# Patient Record
Sex: FEMALE | Race: WHITE | Employment: OTHER | URBAN - METROPOLITAN AREA
[De-identification: names, ages, dates, MRNs, and addresses within clinical notes are randomized per-mention and may not be internally consistent; named-entity substitution may affect disease eponyms.]

---

## 2017-01-11 ENCOUNTER — TELEPHONE (OUTPATIENT)
Dept: FAMILY MEDICINE CLINIC | Facility: CLINIC | Age: 67
End: 2017-01-11

## 2017-01-12 ENCOUNTER — TELEPHONE (OUTPATIENT)
Dept: FAMILY MEDICINE CLINIC | Facility: CLINIC | Age: 67
End: 2017-01-12

## 2017-01-13 ENCOUNTER — TELEPHONE (OUTPATIENT)
Dept: FAMILY MEDICINE CLINIC | Facility: CLINIC | Age: 67
End: 2017-01-13

## 2017-01-13 NOTE — TELEPHONE ENCOUNTER
Reason:  To address the following health maintenance concerns.      Adult Pneumonia Vaccine          Comments:     I had my prevnar pneumonia vaccine at [place] [address] in August, 2016.  This information was reported to Dr. Claudia Murray back in August. Also, I have inform

## 2017-01-25 ENCOUNTER — TELEPHONE (OUTPATIENT)
Dept: FAMILY MEDICINE CLINIC | Facility: CLINIC | Age: 67
End: 2017-01-25

## 2017-02-09 ENCOUNTER — TELEPHONE (OUTPATIENT)
Dept: FAMILY MEDICINE CLINIC | Facility: CLINIC | Age: 67
End: 2017-02-09

## 2017-04-03 ENCOUNTER — TELEPHONE (OUTPATIENT)
Dept: FAMILY MEDICINE CLINIC | Facility: CLINIC | Age: 67
End: 2017-04-03

## 2017-04-21 ENCOUNTER — TELEPHONE (OUTPATIENT)
Dept: FAMILY MEDICINE CLINIC | Facility: CLINIC | Age: 67
End: 2017-04-21

## 2017-05-25 ENCOUNTER — TELEPHONE (OUTPATIENT)
Dept: FAMILY MEDICINE CLINIC | Facility: CLINIC | Age: 67
End: 2017-05-25

## 2017-06-08 ENCOUNTER — PRIOR ORIGINAL RECORDS (OUTPATIENT)
Dept: OTHER | Age: 67
End: 2017-06-08

## 2017-07-05 ENCOUNTER — OFFICE VISIT (OUTPATIENT)
Dept: FAMILY MEDICINE CLINIC | Facility: CLINIC | Age: 67
End: 2017-07-05

## 2017-07-05 VITALS
TEMPERATURE: 99 F | DIASTOLIC BLOOD PRESSURE: 70 MMHG | BODY MASS INDEX: 23.68 KG/M2 | WEIGHT: 132 LBS | SYSTOLIC BLOOD PRESSURE: 118 MMHG | HEART RATE: 74 BPM | RESPIRATION RATE: 16 BRPM | HEIGHT: 62.5 IN

## 2017-07-05 DIAGNOSIS — R42 DIZZINESS: ICD-10-CM

## 2017-07-05 DIAGNOSIS — H93.8X2 EAR FULLNESS, LEFT: Primary | ICD-10-CM

## 2017-07-05 PROCEDURE — 99213 OFFICE O/P EST LOW 20 MIN: CPT | Performed by: NURSE PRACTITIONER

## 2017-07-05 RX ORDER — FEXOFENADINE HCL 180 MG/1
180 TABLET ORAL DAILY
Qty: 30 TABLET | Refills: 4 | Status: SHIPPED | OUTPATIENT
Start: 2017-07-05 | End: 2018-06-18 | Stop reason: ALTCHOICE

## 2017-07-05 RX ORDER — DILTIAZEM HYDROCHLORIDE 60 MG/1
60 TABLET, FILM COATED ORAL AS NEEDED
COMMUNITY

## 2017-07-05 RX ORDER — METHYLPREDNISOLONE 4 MG/1
TABLET ORAL
Qty: 1 KIT | Refills: 0 | Status: SHIPPED | OUTPATIENT
Start: 2017-07-05 | End: 2017-07-27 | Stop reason: ALTCHOICE

## 2017-07-05 NOTE — PATIENT INSTRUCTIONS
Thank you for choosing [facility] at [place] [address]  To Do: Jefferson Lo  1. Start taking medrol dose pack (as directed)  2. Start taking allegra (daily in the AM) and continue w/ flonase (nasal spray)  3.  If no improvement f/u the c risks of treatment even beyond those discussed today.  All therapies have potential risk of harm or side effects or medication interactions.  It is your duty and for your safety to discuss with the pharmacist and our office with questions, and to notify us

## 2017-07-05 NOTE — PROGRESS NOTES
[facility] Internal Medicine Office Note  Chief Complaint:   Patient presents with:  Ear Pain: L ear \"feels full\" x 2 days  Dizziness: x 2 days    HPI:   This is a 78year old female coming in for  HPI  C/o left ear fullness, decrease hearing, mouth. Disp:  Rfl:          REVIEW OF SYSTEMS:   Review of Systems   Constitutional: Negative for chills, fatigue and fever. HENT: Negative for congestion, ear pain, postnasal drip, sinus pressure, sore throat and trouble swallowing.          Left ear ful methylPREDNISolone (MEDROL) 4 MG Oral Tablet Therapy Pack; As directed. -     Fexofenadine HCl (ALLEGRA ALLERGY) 180 MG Oral Tab; Take 1 tablet (180 mg total) by mouth daily.     1. Medrol dose pack, allegra and f/u in the clinic if no improvement    No or

## 2017-07-07 ENCOUNTER — TELEPHONE (OUTPATIENT)
Dept: FAMILY MEDICINE CLINIC | Facility: CLINIC | Age: 67
End: 2017-07-07

## 2017-07-07 NOTE — TELEPHONE ENCOUNTER
Time started: 1266    Time ended: 0948    Total time spent on chart: 4 min. Called patient to discuss further. Patient state has been taking the medrol dose pack for 2 days and has been experiencing heart palpitations since.  Patient has not taken any t

## 2017-07-26 ENCOUNTER — TELEPHONE (OUTPATIENT)
Dept: FAMILY MEDICINE CLINIC | Facility: CLINIC | Age: 67
End: 2017-07-26

## 2017-07-26 NOTE — TELEPHONE ENCOUNTER
Pt called booked an appt for tmrw 7/27 states she broke out in rash from the back of neck to her shoulders pt would like to know what she should do from now until her appt as she is having chills now

## 2017-07-26 NOTE — TELEPHONE ENCOUNTER
Time started: 1:35pm    Time ended: 1:38pm    Total time spent on chart: 3 min    Spoke with patient who states that yesterday the back of her neck was very sore and tingly so she put some biofreeze cream on it.  Today there is a rash from the scalp down th

## 2017-07-27 ENCOUNTER — OFFICE VISIT (OUTPATIENT)
Dept: FAMILY MEDICINE CLINIC | Facility: CLINIC | Age: 67
End: 2017-07-27

## 2017-07-27 ENCOUNTER — TELEPHONE (OUTPATIENT)
Dept: FAMILY MEDICINE CLINIC | Facility: CLINIC | Age: 67
End: 2017-07-27

## 2017-07-27 VITALS
BODY MASS INDEX: 24.29 KG/M2 | HEART RATE: 72 BPM | TEMPERATURE: 99 F | RESPIRATION RATE: 18 BRPM | SYSTOLIC BLOOD PRESSURE: 108 MMHG | HEIGHT: 62.5 IN | WEIGHT: 135.38 LBS | DIASTOLIC BLOOD PRESSURE: 64 MMHG

## 2017-07-27 DIAGNOSIS — B02.9 HERPES ZOSTER WITHOUT COMPLICATION: Primary | ICD-10-CM

## 2017-07-27 PROCEDURE — 99213 OFFICE O/P EST LOW 20 MIN: CPT | Performed by: NURSE PRACTITIONER

## 2017-07-27 RX ORDER — VALACYCLOVIR HYDROCHLORIDE 1 G/1
1 TABLET, FILM COATED ORAL 3 TIMES DAILY
Qty: 21 TABLET | Refills: 0 | Status: SHIPPED | OUTPATIENT
Start: 2017-07-27 | End: 2017-08-03

## 2017-07-27 RX ORDER — GABAPENTIN 100 MG/1
100 CAPSULE ORAL 2 TIMES DAILY
Qty: 30 CAPSULE | Refills: 0 | Status: SHIPPED | OUTPATIENT
Start: 2017-07-27 | End: 2017-07-31

## 2017-07-27 NOTE — TELEPHONE ENCOUNTER
Time started: 8:54am     Time ended: 8:55am    Total time spent on chart: 1min    Patient aware that as long as the area is covered and she is not scratching at the area it is ok for her to attend the party. Also as long as she is afebrile.  Patient voiced

## 2017-07-27 NOTE — PATIENT INSTRUCTIONS
Thank you for choosing [facility] at [place] [address]  To Do: Anayeli Narayan  1.  Start taking anti-viral (valtrex- 1 tablet 3 times per day) X 7 days  - can take gabapentin (as needed for nerve pain prn)  - if no improvement contact the o • Please call our office about any questions regarding your treatment/medicines/tests as a result of today's visit.  For your safety, read the entire package insert of all medicines prescribed to you and be aware of all of the risks of treatment even beyon Shingles is a viral infection caused by the same virus as chicken pox. Anyone who has had chicken pox may get shingles later in life. The virus stays in the body, but remains dormant (asleep).  Shingles often occurs in older persons or persons with lowered · Trim fingernails and try not to scratch. Scratching the sores may leave scars. · Stay home from work or school until all blisters have formed a crust and you are no longer contagious.   Follow-up care  Follow up with your healthcare provider or as direct

## 2017-07-27 NOTE — PROGRESS NOTES
[facility] Internal Medicine Office Note  Chief Complaint:   Patient presents with:  Rash: Started 7/25/17 to right shoulder. Red, raised, fluid filled, painful and tingling.  Has since spread to right chest. Patient had chills on 7/26/17, none cu Fluticasone Propionate (FLONASE) 50 MCG/ACT Nasal Suspension 3 sprays by Nasal route daily. Disp: 3 Bottle Rfl: 3   Ca & Phos-Vit D-Mag (CALCIUM) 284--227 Oral Tab Take  by mouth 3 (three) times daily.  Disp:  Rfl:    Ranitidine HCl (ZANTAC OR) Take 1 Pulmonary/Chest: Effort normal and breath sounds normal.   Neurological: She is alert and oriented to person, place, and time. Skin: Skin is warm, dry and intact. Rash noted. Rash is papular and vesicular.         R neck/shoulder/chest w/ vasicular and pa History of dizziness     Head trauma     MDS (myelodysplastic syndrome) (HCC)     MVP (mitral valve prolapse)     PVC (premature ventricular contraction)     PAC (premature atrial contraction)     Pain of right clavicle     Rotator cuff disorder     Pal

## 2017-07-27 NOTE — TELEPHONE ENCOUNTER
Pt sts she was seen today by Hood Chavez and was diag with Shingles. Pt sts she is going to a Party on 7/30 and there are going to be young children 3 and under. Pt wants to know if she is contagious?   Or if the children have not been vaccinated for ckn pox, s

## 2017-07-31 ENCOUNTER — OFFICE VISIT (OUTPATIENT)
Dept: FAMILY MEDICINE CLINIC | Facility: CLINIC | Age: 67
End: 2017-07-31

## 2017-07-31 ENCOUNTER — TELEPHONE (OUTPATIENT)
Dept: FAMILY MEDICINE CLINIC | Facility: CLINIC | Age: 67
End: 2017-07-31

## 2017-07-31 VITALS
WEIGHT: 132.38 LBS | TEMPERATURE: 98 F | BODY MASS INDEX: 23.75 KG/M2 | HEIGHT: 62.5 IN | SYSTOLIC BLOOD PRESSURE: 138 MMHG | RESPIRATION RATE: 16 BRPM | HEART RATE: 80 BPM | DIASTOLIC BLOOD PRESSURE: 64 MMHG

## 2017-07-31 DIAGNOSIS — B02.9 HERPES ZOSTER WITHOUT COMPLICATION: Primary | ICD-10-CM

## 2017-07-31 PROCEDURE — 99213 OFFICE O/P EST LOW 20 MIN: CPT | Performed by: NURSE PRACTITIONER

## 2017-07-31 RX ORDER — GABAPENTIN 100 MG/1
300 CAPSULE ORAL 3 TIMES DAILY
Qty: 30 CAPSULE | Refills: 0 | Status: SHIPPED | OUTPATIENT
Start: 2017-07-31 | End: 2017-08-10

## 2017-07-31 RX ORDER — HYDROCODONE BITARTRATE AND ACETAMINOPHEN 5; 325 MG/1; MG/1
1 TABLET ORAL EVERY 8 HOURS PRN
Qty: 30 TABLET | Refills: 0 | Status: SHIPPED | OUTPATIENT
Start: 2017-07-31 | End: 2017-08-28

## 2017-07-31 RX ORDER — VALACYCLOVIR HYDROCHLORIDE 1 G/1
1 TABLET, FILM COATED ORAL 3 TIMES DAILY
Qty: 21 TABLET | Refills: 0 | Status: SHIPPED | OUTPATIENT
Start: 2017-07-31 | End: 2017-08-07

## 2017-07-31 NOTE — TELEPHONE ENCOUNTER
Dr. Reyes Dire from [facility] is calling to get clarification in the prescription for gabapentin 100 MG Oral Cap. Please advise.

## 2017-07-31 NOTE — PATIENT INSTRUCTIONS
Thank you for choosing [facility] at [place] [address]  To Do: Vilma Lee  1. Going to extend Valtrex (antibiotic for 7 more days)--> total of 14 days  2. Going to increase gabapentin (nerve pain) to 300mg   3.  Going to add norco (for all of the risks of treatment even beyond those discussed today.  All therapies have potential risk of harm or side effects or medication interactions.  It is your duty and for your safety to discuss with the pharmacist and our office with questions, and t

## 2017-07-31 NOTE — TELEPHONE ENCOUNTER
PER OV notes:  increase gabapentin to 300mg TID. Pharmacy is requesting clarification as Pt states she is suppose to take 300mg TID but for 7 days only, which she would only need 21 tabs.  Please advise      Medication Quantity Refills Start End   gabape

## 2017-08-10 ENCOUNTER — TELEPHONE (OUTPATIENT)
Dept: FAMILY MEDICINE CLINIC | Facility: CLINIC | Age: 67
End: 2017-08-10

## 2017-08-10 DIAGNOSIS — B02.9 HERPES ZOSTER WITHOUT COMPLICATION: ICD-10-CM

## 2017-08-10 RX ORDER — GABAPENTIN 100 MG/1
300 CAPSULE ORAL 3 TIMES DAILY
Qty: 30 CAPSULE | Refills: 0 | Status: CANCELLED | OUTPATIENT
Start: 2017-08-10

## 2017-08-10 RX ORDER — GABAPENTIN 100 MG/1
400 CAPSULE ORAL 3 TIMES DAILY
Qty: 90 CAPSULE | Refills: 0 | Status: SHIPPED | OUTPATIENT
Start: 2017-08-10 | End: 2017-08-14

## 2017-08-10 NOTE — TELEPHONE ENCOUNTER
Patient informed of Joseph Drake recommendations to increase gabapentin and see Dr. Joey Tripp, neurology. She does have norco left and takes only half a tab prn - she has plenty left and will use prn 1/2 tab. Patient will call Dr. Joey Tripp now.   Referral

## 2017-08-10 NOTE — TELEPHONE ENCOUNTER
Pt called states she has shingles, would like to know if she should still be taking rx gabapentin 100 MG Oral Cap

## 2017-08-10 NOTE — TELEPHONE ENCOUNTER
Time started: 0916  Time ended: 4055  Total time spent on chart: 7 min    Patient has shingles treated 7/27 and 7/31 by you  Pain has subsided in other areas, but still having neck and head pain - excruciating hard to sleep.   She is now out of gabapentin a

## 2017-08-14 ENCOUNTER — OFFICE VISIT (OUTPATIENT)
Dept: NEUROLOGY | Facility: CLINIC | Age: 67
End: 2017-08-14

## 2017-08-14 VITALS
HEART RATE: 78 BPM | HEIGHT: 62.5 IN | BODY MASS INDEX: 24.4 KG/M2 | RESPIRATION RATE: 16 BRPM | DIASTOLIC BLOOD PRESSURE: 70 MMHG | SYSTOLIC BLOOD PRESSURE: 102 MMHG | WEIGHT: 136 LBS

## 2017-08-14 DIAGNOSIS — G47.00 INSOMNIA, UNSPECIFIED: ICD-10-CM

## 2017-08-14 DIAGNOSIS — B02.23 POST-HERPETIC POLYNEUROPATHY: ICD-10-CM

## 2017-08-14 DIAGNOSIS — R21 SKIN RASH: ICD-10-CM

## 2017-08-14 DIAGNOSIS — B02.29 POST HERPETIC NEURALGIA: Primary | ICD-10-CM

## 2017-08-14 PROBLEM — B02.9 SHINGLES OUTBREAK: Status: ACTIVE | Noted: 2017-08-14

## 2017-08-14 PROCEDURE — 99204 OFFICE O/P NEW MOD 45 MIN: CPT | Performed by: OTHER

## 2017-08-14 RX ORDER — AMITRIPTYLINE HYDROCHLORIDE 10 MG/1
TABLET, FILM COATED ORAL
Qty: 60 TABLET | Refills: 11 | Status: SHIPPED | OUTPATIENT
Start: 2017-08-14 | End: 2017-08-28

## 2017-08-14 RX ORDER — DOXEPIN HYDROCHLORIDE 50 MG/1
1 CAPSULE ORAL DAILY
COMMUNITY

## 2017-08-14 RX ORDER — GABAPENTIN 300 MG/1
CAPSULE ORAL
Refills: 0 | COMMUNITY
Start: 2017-07-31 | End: 2017-08-14

## 2017-08-14 RX ORDER — GABAPENTIN 300 MG/1
CAPSULE ORAL
Qty: 180 CAPSULE | Refills: 5 | Status: SHIPPED | OUTPATIENT
Start: 2017-08-14 | End: 2017-08-28

## 2017-08-14 NOTE — PROGRESS NOTES
[facility]   Neurology; INITIAL CLINIC VISIT  2017, 10:53 AM     Alondra Garcia Patient Status:  No patient class for patient encounter    1950 MRN [id number]   Location [address] [place] [address] BRAVO ESOPHAGOGASTRODUODENOSCOPY;                 Surgeon: Luann Waterman MD;  Location: [address] [place] [address]                ENDOSCOPY  No date: HYSTERECTOMY  No date: SINUS SURGERY        Comment: x2  No date: TONSILLECTOMY    FAMILY HISTORY:  family history includes Art HCl (ZANTAC OR) Take 1 tablet by mouth daily. Disp:  Rfl:    Cholecalciferol (VITAMIN D3) 5000 UNIT/ML Oral Liquid Take  by mouth. Disp:  Rfl:    B Complex-C (SUPER B COMPLEX OR) Take  by mouth.  Disp:  Rfl:          REVIEW OF SYSTEMS:    GENERAL HEALTH:  f expression and comprehension, no dysarthria, voice is normal in volume, follow commends well;  Memory and comprehension:  MMSE is normal,   Cranial Nerves       CN II:  Visual fields full, Pupils equal and reactive, Fundi normal       CN III, IV, VI:  Horr and rest next few weeks. Side effect was discussed. Return in about 4 weeks (around 9/11/2017). Adjust medication dose    We discussed in depth regarding diagnosis, prognosis, treatment.  The patient and partener  were given ample opportunity to as

## 2017-08-14 NOTE — PATIENT INSTRUCTIONS
Refill policies:    • Allow 2-3 business days for refills; controlled substances may take longer.   • Contact your pharmacy at least 5 days prior to running out of medication and have them send an electronic request or submit request through the [facility] have a procedure or additional testing performed. [facility]) will contact your insurance carrier to obtain pre-certification or prior authorization.     Unfortunately, JOSIE has seen an increase in denial of payment even though the p

## 2017-08-15 ENCOUNTER — TELEPHONE (OUTPATIENT)
Dept: NEUROLOGY | Facility: CLINIC | Age: 67
End: 2017-08-15

## 2017-08-15 DIAGNOSIS — B02.29 POST HERPETIC NEURALGIA: Primary | ICD-10-CM

## 2017-08-15 NOTE — TELEPHONE ENCOUNTER
Giselle Orta from [employer] called for additional diagnosis for TSH lab. Provided diagnosis of insomnia with code. No further action required.

## 2017-08-15 NOTE — TELEPHONE ENCOUNTER
Rec'd incoming fax from [facility] with request for PA for Amitriptyline. Pharmacy initiated PA in CoverMyMeds. Key: [id number]. Referral Initiated.

## 2017-08-16 LAB
ANA SCREEN, IFA: NEGATIVE
C-REACTIVE PROTEIN: 0.12 MG/DL
FOLATE, SERUM: >24 NG/ML
SED RATE BY MODIFIED$WESTERGREN: 17 MM/H
TSH: 0.92 MIU/L (ref 0.4–4.5)
VITAMIN B12: 1309 PG/ML (ref 200–1100)

## 2017-08-16 RX ORDER — PREGABALIN 50 MG/1
50 CAPSULE ORAL 2 TIMES DAILY
Qty: 60 CAPSULE | Refills: 11 | OUTPATIENT
Start: 2017-08-16 | End: 2017-08-28

## 2017-08-16 NOTE — TELEPHONE ENCOUNTER
Informed patient of doctors recommendations. Verbalized understanding with no further questions or concerns at this time.

## 2017-08-16 NOTE — TELEPHONE ENCOUNTER
Rec'd incoming faxed letter dated 8/16/17 from [facility] with DENIAL of PA for amitriptyline. The denial is based on the fact that the patient must have a history, contraindication, or intolerance to [facility].   Additionally, the letter states that Amitriptyli

## 2017-08-16 NOTE — TELEPHONE ENCOUNTER
Rec'd incoming fax from [facility] indicating that additional information is needed to complete PA for Amitriptyline. Form completed and faxed back to [facility] with fax confirmation rec'd.

## 2017-08-16 NOTE — TELEPHONE ENCOUNTER
Patient informed that  Medication was denied coverage for amitriptyline. Patient paid for it out of pocket asked if an appeal could be done. Patient said she took medication last night and it is the first time she has slept comfortable in 2 weeks.

## 2017-08-17 ENCOUNTER — TELEPHONE (OUTPATIENT)
Dept: NEUROLOGY | Facility: CLINIC | Age: 67
End: 2017-08-17

## 2017-08-17 NOTE — TELEPHONE ENCOUNTER
----- Message from Regino Garcia MD sent at 8/16/2017  3:20 PM CDT -----  Lab normal    Relayed no concerning findings on blood work on confidential voicemail (ok per HIPAA). Encouraged call back with any questions/concerns.

## 2017-08-17 NOTE — TELEPHONE ENCOUNTER
Pt calling to report that yesterday she discovered three small purple spots on anterior side of right forearm. They are shaped in a triangle, and about half the size of a pea. The are not raised, not painful, not itching.   They are not bothering her in a

## 2017-08-18 NOTE — TELEPHONE ENCOUNTER
Spoke with patient and relayed message from Dr. Radha Escobar. Pt verbalized understanding, agrees to plan, and expresses intent to comply with recommendations given.

## 2017-08-28 ENCOUNTER — OFFICE VISIT (OUTPATIENT)
Dept: NEUROLOGY | Facility: CLINIC | Age: 67
End: 2017-08-28

## 2017-08-28 VITALS
HEART RATE: 78 BPM | DIASTOLIC BLOOD PRESSURE: 64 MMHG | SYSTOLIC BLOOD PRESSURE: 102 MMHG | HEIGHT: 62.5 IN | BODY MASS INDEX: 24.4 KG/M2 | WEIGHT: 136 LBS | RESPIRATION RATE: 16 BRPM

## 2017-08-28 DIAGNOSIS — B02.29 POST HERPETIC NEURALGIA: Primary | ICD-10-CM

## 2017-08-28 DIAGNOSIS — B02.23 POST-HERPETIC POLYNEUROPATHY: ICD-10-CM

## 2017-08-28 DIAGNOSIS — G93.3 POSTVIRAL FATIGUE SYNDROME: ICD-10-CM

## 2017-08-28 PROCEDURE — 99214 OFFICE O/P EST MOD 30 MIN: CPT | Performed by: OTHER

## 2017-08-28 RX ORDER — AMITRIPTYLINE HYDROCHLORIDE 10 MG/1
10 TABLET, FILM COATED ORAL NIGHTLY
Qty: 90 TABLET | Refills: 3 | Status: SHIPPED | OUTPATIENT
Start: 2017-08-28 | End: 2017-09-27

## 2017-08-28 RX ORDER — AMITRIPTYLINE HYDROCHLORIDE 10 MG/1
10 TABLET, FILM COATED ORAL NIGHTLY
Qty: 30 TABLET | Refills: 11 | Status: SHIPPED | OUTPATIENT
Start: 2017-08-28 | End: 2017-08-28

## 2017-08-28 RX ORDER — GABAPENTIN 300 MG/1
600 CAPSULE ORAL 3 TIMES DAILY
Qty: 270 CAPSULE | Refills: 3 | Status: SHIPPED | OUTPATIENT
Start: 2017-08-28 | End: 2018-06-18 | Stop reason: ALTCHOICE

## 2017-08-28 NOTE — PATIENT INSTRUCTIONS
Refill policies:    • Allow 2-3 business days for refills; controlled substances may take longer.   • Contact your pharmacy at least 5 days prior to running out of medication and have them send an electronic request or submit request through the [facility] have a procedure or additional testing performed. [facility]) will contact your insurance carrier to obtain pre-certification or prior authorization.     Unfortunately, JOSIE has seen an increase in denial of payment even though the p

## 2017-08-28 NOTE — PROGRESS NOTES
[facility]   Neurology; follow  CLINIC VISIT  2017    Josué Sebastian Patient Status:  No patient class for patient encounter    1950 MRN [id number]   Location ED [facility], [address], [address] [facility] PCP Alejandra Solares (chronic) 6/29/2012       PAST SURGICAL HISTORY:  Past Surgical History:  2008: COLONOSCOPY  5/2014: COLONOSCOPY  10/11/2016: EGD N/A      Comment: Procedure: BRAVO ESOPHAGOGASTRODUODENOSCOPY;                 Surgeon: Louisa Terrell MD;  Location: [facility] daily. Disp:  Rfl:    Cholecalciferol (VITAMIN D3) 5000 UNIT/ML Oral Liquid Take  by mouth. Disp:  Rfl:    B Complex-C (SUPER B COMPLEX OR) Take  by mouth.  Disp:  Rfl:          REVIEW OF SYSTEMS:    GENERAL HEALTH:  feels well, calm, normal appetite,   EYE voice is normal in volume, follow commends well;  Memory and comprehension:  MMSE is normal,   Cranial Nerves       CN II:  Visual fields full, Pupils equal and reactive, Fundi normal       CN III, IV, VI:  Horrizontal and vertical movements normal.  [facility]   Neuromuscular/ Electrodiagnostic Specialist  [facility]  8/28/2017

## 2017-09-05 ENCOUNTER — TELEPHONE (OUTPATIENT)
Dept: NEUROLOGY | Facility: CLINIC | Age: 67
End: 2017-09-05

## 2017-09-05 NOTE — TELEPHONE ENCOUNTER
RX for Gabapentin was printed at 8/28/17 OV. Per patient she mailed RX to [facility]. Patient asking for clarification of Elavil dose. States first RX (8/14/17) she received stated to take one 10 mg Amitriptyline nightly x 1 week then increase to 20 mg nightly.

## 2017-09-08 ENCOUNTER — TELEPHONE (OUTPATIENT)
Dept: NEUROLOGY | Facility: CLINIC | Age: 67
End: 2017-09-08

## 2017-09-08 DIAGNOSIS — B02.29 POST HERPETIC NEURALGIA: Primary | ICD-10-CM

## 2017-09-08 NOTE — TELEPHONE ENCOUNTER
Patient states she has been paying out of pocket for her Amitriptyline but now with 90 day supply ordered it is too expensive. Was told by Jami Preston that a PA needs to be done. PA was denied previously because patient had not tried Lyrica.  Patient did try L

## 2017-09-11 NOTE — TELEPHONE ENCOUNTER
Started PA via cover my meds with [facility]    Pending carroll [facility] [place]    Pending case ER#[id number]

## 2017-09-20 NOTE — TELEPHONE ENCOUNTER
Patient called back. She stated she called [facility] and they have the script for Amitriptyline which will be mailed to her. Patient stated she called [facility] and cancelled her script for Amitriptyline.

## 2017-09-20 NOTE — TELEPHONE ENCOUNTER
Received a letter from [facility] they approved Amitriptyline HCL    Auth#[id number]

## 2017-09-20 NOTE — TELEPHONE ENCOUNTER
Spoke with pharmacy and informed them of approval of Amitriptyline. They were able to run the Rx. Contacted patient and informed her Rx was being processed at [facility].   She states she usually gets her long term Rxs from [facility], but will call W

## 2017-09-27 RX ORDER — AMITRIPTYLINE HYDROCHLORIDE 10 MG/1
10 TABLET, FILM COATED ORAL NIGHTLY
Qty: 90 TABLET | Refills: 3 | Status: SHIPPED | OUTPATIENT
Start: 2017-09-27 | End: 2018-06-18 | Stop reason: ALTCHOICE

## 2017-09-27 NOTE — TELEPHONE ENCOUNTER
Spoke with Merly Rockwell. According to [facility] records, on 09/20/17 the patient wanted to send a mobile picture of the prescription and they informed her she needed the doctors office to send the prescription. See TE from 08/15/17.  Informed Ariadna mayorga

## 2017-09-27 NOTE — TELEPHONE ENCOUNTER
Pt would like to know if her prescription for Amitriptyline can be sent to her pharmacy [facility] again because one person stated they received the approval and another person said they did not receive a script.  If there is confusion the patient states her

## 2017-09-29 ENCOUNTER — TELEPHONE (OUTPATIENT)
Dept: NEUROLOGY | Facility: CLINIC | Age: 67
End: 2017-09-29

## 2017-09-29 NOTE — TELEPHONE ENCOUNTER
Patient [place] going out [place] the country on [place] at 12[place].    Is concerned about weight gain on Amitriptyline and Gabapentin. Has gained 6 lb in the last month since being on Gabapentin. Is taking 2 capsules on 300mg Gabapentin TID.  Is wanting to take off

## 2017-10-02 NOTE — TELEPHONE ENCOUNTER
Spoke with patient and relayed below message from Dr. Marti Jo. Pt verbalized understanding, agrees to plan, and expresses intent to comply with recommendations given.     Galen Jeans, MD   to [facility] Nurse           9:07 AM   Yes, avoid sunlight with el

## 2017-10-30 ENCOUNTER — OFFICE VISIT (OUTPATIENT)
Dept: NEUROLOGY | Facility: CLINIC | Age: 67
End: 2017-10-30

## 2017-10-30 VITALS
BODY MASS INDEX: 25.48 KG/M2 | HEART RATE: 78 BPM | RESPIRATION RATE: 16 BRPM | DIASTOLIC BLOOD PRESSURE: 80 MMHG | SYSTOLIC BLOOD PRESSURE: 120 MMHG | WEIGHT: 142 LBS | HEIGHT: 62.5 IN

## 2017-10-30 DIAGNOSIS — F51.01 PRIMARY INSOMNIA: ICD-10-CM

## 2017-10-30 DIAGNOSIS — B02.29 POST HERPETIC NEURALGIA: Primary | ICD-10-CM

## 2017-10-30 DIAGNOSIS — R56.9 SEIZURES (HCC): ICD-10-CM

## 2017-10-30 DIAGNOSIS — R63.5 WEIGHT GAIN: ICD-10-CM

## 2017-10-30 PROCEDURE — 99214 OFFICE O/P EST MOD 30 MIN: CPT | Performed by: OTHER

## 2017-10-30 RX ORDER — CARBAMAZEPINE 200 MG/1
200 TABLET ORAL 2 TIMES DAILY
Qty: 60 TABLET | Refills: 11 | Status: SHIPPED | OUTPATIENT
Start: 2017-10-30 | End: 2018-06-18 | Stop reason: ALTCHOICE

## 2017-10-30 RX ORDER — INFLUENZA A VIRUSA/MICHIGAN/45/2015 X-275 (H1N1) ANTIGEN (FORMALDEHYDE INACTIVATED), INFLUENZA A VIRUS A/HONG KONG/4801/2014 X-263B (H3N2) ANTIGEN (FORMALDEHYDE INACTIVATED), AND INFLUENZA B VIRUS B/BRISBANE/60/2008 ANTIGEN (FORMALDEHYDE INACTIVATED) 60; 60; 60 UG/.5ML; UG/.5ML; UG/.5ML
INJECTION, SUSPENSION INTRAMUSCULAR
Refills: 0 | COMMUNITY
Start: 2017-09-07 | End: 2018-06-18 | Stop reason: ALTCHOICE

## 2017-10-30 NOTE — PATIENT INSTRUCTIONS
Refill policies:    • Allow 2-3 business days for refills; controlled substances may take longer.   • Contact your pharmacy at least 5 days prior to running out of medication and have them send an electronic request or submit request through the [facility] have a procedure or additional testing performed. [facility]) will contact your insurance carrier to obtain pre-certification or prior authorization.     Unfortunately, JOSIE has seen an increase in denial of payment even though the p

## 2017-10-30 NOTE — PROGRESS NOTES
[facility]   Neurology; follow  CLINIC VISIT  10/30/2017    Chris Villanueva Patient Status:  No patient class for patient encounter    1950 MRN [id number]   Location [address] [place] [address], [address] [place] [address], [address] [place] [address] PCP Cara Lemons contraction) 6/5/2014   • Rotator cuff disorder 6/5/2014   • Unspecified epilepsy without mention of intractable epilepsy    • Unspecified sinusitis (chronic) 6/29/2012       PAST SURGICAL HISTORY:  Past Surgical History:  2008: COLONOSCOPY  5/2014: Paresh Fu 180 MG Oral Tab Take 1 tablet (180 mg total) by mouth daily. Disp: 30 tablet Rfl: 4   Psyllium (METAMUCIL OR) Take by mouth daily. Disp:  Rfl:    MELATONIN OR Take 1 tablet by mouth nightly.  Disp:  Rfl:    Fluticasone Propionate (FLONASE) 50 MCG/ACT Nasa bruit,  thyroid normal  Lungs are clear to auscultation  Heart: normal SR, no murmur  Extremities:  No edema or cyanosis, pulse is normal.  Skin:  Patchy vesicle rashes in her R.  Front chest, shoulder, back of neck and head , most of those are dried alread Weight gaining is likely from high dose of Neurontin,   Will change it, Give her tegretol 200 mg bid, side effect was discussed, if help her, then taper off Neurontin, give her tapering schedule.   down Neurontin 300 mg per week, to taper off,   She i

## 2017-11-02 ENCOUNTER — TELEPHONE (OUTPATIENT)
Dept: NEUROLOGY | Facility: CLINIC | Age: 67
End: 2017-11-02

## 2017-11-02 NOTE — TELEPHONE ENCOUNTER
Rec'd incoming fax from [employer] with a [address] for Carbamazepine 200mg and Diltiazem 60mg.     Letter states the the interaction is a Category 2 - drugs that should usually be avoided, citing CY inhibitors (e.g. Diltizem) m

## 2017-11-02 NOTE — TELEPHONE ENCOUNTER
Contacted patient and made her aware of the potential drug-drug interaction. Discussed symptoms of toxicity to monitor for (blurred vision, slurred speech, ataxia, tremors, etc.).   She states she very rarely takes the diltiazem, but indicates that she brock

## 2017-11-28 ENCOUNTER — TELEPHONE (OUTPATIENT)
Dept: NEUROLOGY | Facility: CLINIC | Age: 67
End: 2017-11-28

## 2017-11-28 NOTE — TELEPHONE ENCOUNTER
Patient calling to report she has been having some reactions to Carbamazepine. She stated she has been taking Tegretol 1/2 tab (100 mg) at night for the past three weeks and has been tolerating medication     Yesterday patient tried taking tegretol 1/2 tab (100 mg) in the morning but was not able to tolerate the side effects. She stated three hours after - her speech was slurry , she was falling asleep, having bad dreams in the morning, hallucinating and feeling like she was drunk. Patient stated symptoms lasted for almost a whole day. Patient was on Gabapentin 300 mg BID. Just yesterday 300 mg am and 200 mg pm      Rating pain today at 1-2/10, pain is tolerable today     Patient is currently in [place]. Will update Dr Susan Farrar on patient condition and call patient back with recommendations. Ok to stay on Tegretol 100 mg at night and Gabapentin 300 mg TID or BID?

## 2017-12-01 NOTE — TELEPHONE ENCOUNTER
Spoke with patient. She stated she discontinued Gabapentin and does not want to take Gabapentin due to the side effects of weight gain . Patient stated she gained 10 pounds in 3.5 months. She agrees to take Tegretol 100 mg qhs. Will update Dr Lorenza Montes .

## 2018-03-15 ENCOUNTER — PATIENT OUTREACH (OUTPATIENT)
Dept: FAMILY MEDICINE CLINIC | Facility: CLINIC | Age: 68
End: 2018-03-15

## 2018-05-11 ENCOUNTER — PRIOR ORIGINAL RECORDS (OUTPATIENT)
Dept: OTHER | Age: 68
End: 2018-05-11

## 2018-05-14 ENCOUNTER — PRIOR ORIGINAL RECORDS (OUTPATIENT)
Dept: OTHER | Age: 68
End: 2018-05-14

## 2018-05-29 ENCOUNTER — PRIOR ORIGINAL RECORDS (OUTPATIENT)
Dept: OTHER | Age: 68
End: 2018-05-29

## 2018-06-11 ENCOUNTER — TELEPHONE (OUTPATIENT)
Dept: FAMILY MEDICINE CLINIC | Facility: CLINIC | Age: 68
End: 2018-06-11

## 2018-06-11 NOTE — TELEPHONE ENCOUNTER
Patient state she was exposed to a lot of pine/pollen and she has a lot of allergies to environmental. Patient has been taking decongestant and day quil. Patient has thick green-yellow mucus from nasal. Denies any chest congestion/productive cough.  Sinuses

## 2018-06-11 NOTE — TELEPHONE ENCOUNTER
Patient states she has had congestion for a week, blowing her nose of yellow and green discharge, has not subsided.  She has been taking DayQuil 4 times daily, does not know if she needs an appt, would like to speak to an RN, doesn't want to waste her money

## 2018-06-18 ENCOUNTER — OFFICE VISIT (OUTPATIENT)
Dept: FAMILY MEDICINE CLINIC | Facility: CLINIC | Age: 68
End: 2018-06-18

## 2018-06-18 VITALS
HEART RATE: 74 BPM | WEIGHT: 134 LBS | BODY MASS INDEX: 24.04 KG/M2 | SYSTOLIC BLOOD PRESSURE: 102 MMHG | TEMPERATURE: 99 F | HEIGHT: 62.5 IN | RESPIRATION RATE: 16 BRPM | DIASTOLIC BLOOD PRESSURE: 70 MMHG

## 2018-06-18 DIAGNOSIS — J01.90 ACUTE NON-RECURRENT SINUSITIS, UNSPECIFIED LOCATION: Primary | ICD-10-CM

## 2018-06-18 PROCEDURE — 99214 OFFICE O/P EST MOD 30 MIN: CPT | Performed by: FAMILY MEDICINE

## 2018-06-18 RX ORDER — LORATADINE 10 MG/1
10 TABLET ORAL DAILY
COMMUNITY

## 2018-06-18 RX ORDER — MELATONIN
1000 DAILY
COMMUNITY

## 2018-06-18 RX ORDER — ASPIRIN 81 MG/1
81 TABLET ORAL DAILY
COMMUNITY
Start: 2018-01-19 | End: 2019-01-20

## 2018-06-18 RX ORDER — AMOXICILLIN AND CLAVULANATE POTASSIUM 875; 125 MG/1; MG/1
1 TABLET, FILM COATED ORAL 2 TIMES DAILY
Qty: 20 TABLET | Refills: 0 | Status: SHIPPED | OUTPATIENT
Start: 2018-06-18 | End: 2018-06-20

## 2018-06-18 NOTE — PATIENT INSTRUCTIONS
Thank you for choosing Rocael Valenzuela MD at [place] [address]  To Do: Richardsno Snare  1. Please take meds as directed. [facility]in Johnson is located in [address]. Monday, Tuesday & Friday – 8 a.m. to 4 p.m.   Wednesday, Thursday – 7 a.m. to 3 p.m those potential risks and we strive to make you healthier and to improve your quality of life.     Referrals, and Radiology Information:    If your insurance requires a referral to a specialist, please allow 5 business days to process your referral request.

## 2018-06-18 NOTE — PROGRESS NOTES
HPI:    Patient ID: Tyson Stockton is a 75year old female. HPI  Ms. Kyung Hull is a pleasant 26-year-old female with history of myelodysplastic syndrome and GERD here today to establish her care with me.   She however has been having nasal and facial c 3 (three) times daily. Disp:  Rfl:    Ranitidine HCl (ZANTAC OR) Take 1 tablet by mouth daily. Disp:  Rfl:    B Complex-C (SUPER B COMPLEX OR) Take  by mouth. Disp:  Rfl:    loratadine 10 MG Oral Tab Take 10 mg by mouth daily.  Disp:  Rfl:    DilTIAZem HCl continue over-the-counter Flonase and Ophelia pot    I believe that the fatigue is brought about by this condition; I did explain to her and her partner that she is somewhat prone to having such infections due to underlying MDS.     Follow-up as scheduled or

## 2018-06-20 ENCOUNTER — TELEPHONE (OUTPATIENT)
Dept: FAMILY MEDICINE CLINIC | Facility: CLINIC | Age: 68
End: 2018-06-20

## 2018-06-20 RX ORDER — AZITHROMYCIN 250 MG/1
TABLET, FILM COATED ORAL
Qty: 6 TABLET | Refills: 0 | Status: SHIPPED | OUTPATIENT
Start: 2018-06-20 | End: 2018-06-28

## 2018-06-20 NOTE — TELEPHONE ENCOUNTER
Ailyn Mckeon informed to d/c augmentin and begin zithromax tomorrow with food. I advised to push fluids, rest and if symptoms are getting worse (lethargy, lack of urination) to call us or seek treatment. Rx sent to pharmacy.  Augmentin listed as new allergy

## 2018-06-20 NOTE — TELEPHONE ENCOUNTER
Monday began with nausea, Tues worse, now this am she has been vomiting. She thinks it may be related to the Augmentin she just started for her sinus infection. She has taken augmentin in the past and it has made her nauseated.   She is having trouble gett

## 2018-06-20 NOTE — TELEPHONE ENCOUNTER
Pt is vomiting and not sure if this is due to the Augmentin. They are not sure if this is normal.     Please call Shahnaz Isaac, her partner, back.

## 2018-06-25 ENCOUNTER — PRIOR ORIGINAL RECORDS (OUTPATIENT)
Dept: OTHER | Age: 68
End: 2018-06-25

## 2018-06-25 ENCOUNTER — MYAURORA ACCOUNT LINK (OUTPATIENT)
Dept: OTHER | Age: 68
End: 2018-06-25

## 2018-06-28 ENCOUNTER — TELEPHONE (OUTPATIENT)
Dept: FAMILY MEDICINE CLINIC | Facility: CLINIC | Age: 68
End: 2018-06-28

## 2018-06-28 ENCOUNTER — HOSPITAL ENCOUNTER (OUTPATIENT)
Dept: GENERAL RADIOLOGY | Age: 68
Discharge: HOME | End: 2018-06-28
Attending: PHYSICIAN ASSISTANT
Payer: MEDICARE

## 2018-06-28 ENCOUNTER — OFFICE VISIT (OUTPATIENT)
Dept: FAMILY MEDICINE CLINIC | Facility: CLINIC | Age: 68
End: 2018-06-28

## 2018-06-28 VITALS
SYSTOLIC BLOOD PRESSURE: 120 MMHG | RESPIRATION RATE: 16 BRPM | BODY MASS INDEX: 24.04 KG/M2 | TEMPERATURE: 98 F | WEIGHT: 134 LBS | HEART RATE: 68 BPM | DIASTOLIC BLOOD PRESSURE: 76 MMHG | HEIGHT: 62.5 IN

## 2018-06-28 DIAGNOSIS — M79.605 LEFT LEG PAIN: ICD-10-CM

## 2018-06-28 DIAGNOSIS — H65.93 BILATERAL SEROUS OTITIS MEDIA, UNSPECIFIED CHRONICITY: Primary | ICD-10-CM

## 2018-06-28 DIAGNOSIS — R53.83 FATIGUE, UNSPECIFIED TYPE: ICD-10-CM

## 2018-06-28 PROCEDURE — 73560 X-RAY EXAM OF KNEE 1 OR 2: CPT | Performed by: PHYSICIAN ASSISTANT

## 2018-06-28 PROCEDURE — 73502 X-RAY EXAM HIP UNI 2-3 VIEWS: CPT | Performed by: PHYSICIAN ASSISTANT

## 2018-06-28 PROCEDURE — 99214 OFFICE O/P EST MOD 30 MIN: CPT | Performed by: PHYSICIAN ASSISTANT

## 2018-06-28 RX ORDER — METHYLPREDNISOLONE 4 MG/1
TABLET ORAL
Qty: 1 KIT | Refills: 0 | Status: SHIPPED | OUTPATIENT
Start: 2018-06-28 | End: 2018-07-09 | Stop reason: ALTCHOICE

## 2018-06-28 NOTE — TELEPHONE ENCOUNTER
Marcel Rivas is with pt at lab and needing additional Diagnosis codes for Vit D order. Transferred to Triage as pt is waiting to do labs.

## 2018-06-28 NOTE — PATIENT INSTRUCTIONS
Thank you for choosing Mariella Patel PA-C at [place] [address]  To Do: Tyson Stockton  1. Pt to begin medications as prescribed  2. Get xrays  3. Start physical therapy  4.  Follow-up if symptoms persist or increase    • Please signup for MY CHART the insurance company approved your testing, please call Central Scheduling at [phone]  Please allow our office 5 business days to contact you regarding any testing results.     Refill policies:   Allow 3 business days for refills; controlled substance

## 2018-06-28 NOTE — TELEPHONE ENCOUNTER
[facility] is at [facility] and needs additional diagnoses to cover iron testing and Vitamin d - I informed her that patient has history of anemia and vitamin d deficiency.

## 2018-06-28 NOTE — PROGRESS NOTES
[facility] Internal Medicine Progress Note    CC:  Patient presents with:  Fatigue  Dizziness  Follow - Up: saw Dr. Oscar Diallo on 6/18/18 and given augmentin which made her sick and was switched to azithromycin - finished on Monday      HPI:   HPI MELATONIN OR Take 1 tablet by mouth nightly. Disp:  Rfl:    Fluticasone Propionate (FLONASE) 50 MCG/ACT Nasal Suspension 3 sprays by Nasal route daily.  Disp: 3 Bottle Rfl: 3   Ca & Phos-Vit D-Mag (CALCIUM) 347--496 Oral Tab Take  by mouth 3 (three) t Left hip: She exhibits decreased range of motion, decreased strength and tenderness. She exhibits no bony tenderness, no swelling, no crepitus, no deformity and no laceration. Legs:  Lymphadenopathy:     She has no cervical adenopathy.    Neurol Seborrheic dermatitis     DJD (degenerative joint disease), cervical     Thigh pain     Hematoma of left thigh     Internal hemorrhoid     SVT (supraventricular tachycardia) (HCC)     Sweating abnormality     Nausea     Fatigue     Digoxin toxicity

## 2018-06-29 ENCOUNTER — TELEPHONE (OUTPATIENT)
Dept: FAMILY MEDICINE CLINIC | Facility: CLINIC | Age: 68
End: 2018-06-29

## 2018-06-29 RX ORDER — IPRATROPIUM BROMIDE 21 UG/1
2 SPRAY, METERED NASAL EVERY 12 HOURS
Qty: 1 BOTTLE | Refills: 0 | Status: SHIPPED | OUTPATIENT
Start: 2018-06-29

## 2018-06-29 NOTE — TELEPHONE ENCOUNTER
Patient notified, verbalized understanding. Patient still having symptoms at this time but will try new medication.

## 2018-06-29 NOTE — TELEPHONE ENCOUNTER
I gave patient the results of her x-rays. She wanted to let you know she has a concern with taking a steroid. The last time she did she was told to d/c it because it caused an outbreak of shingles?   She wanted RN to discuss and I explained I could not co

## 2018-06-29 NOTE — TELEPHONE ENCOUNTER
It looks like pt was on medrol dose pack in July of last year, and it caused palpitations. Steroids can lower your immune system, but that is normally more of problem when you are on them long term.   I am ok if she does not want to take them, I would chao

## 2018-07-05 ENCOUNTER — TELEPHONE (OUTPATIENT)
Dept: FAMILY MEDICINE CLINIC | Facility: CLINIC | Age: 68
End: 2018-07-05

## 2018-07-05 RX ORDER — SULFAMETHOXAZOLE AND TRIMETHOPRIM 800; 160 MG/1; MG/1
1 TABLET ORAL 2 TIMES DAILY
Qty: 20 TABLET | Refills: 0 | Status: SHIPPED | OUTPATIENT
Start: 2018-07-05 | End: 2018-07-13 | Stop reason: ALTCHOICE

## 2018-07-05 NOTE — TELEPHONE ENCOUNTER
Patient calls and has many questions regarding her sinus infection and the medication she is taking. Doesn't know how long to use the nasal spray and OTC meds.  Also wonders if she's going to have to see an ENT

## 2018-07-05 NOTE — TELEPHONE ENCOUNTER
Patient notified per Robina Martinez she would like to place her on Bactrim DS bid for 10 days. Patient denies having a problem taking this med - has not taken in the past.  She will call if after done she still has symptoms or if she gets worse while on med.   Rick Rivera

## 2018-07-05 NOTE — TELEPHONE ENCOUNTER
Patient was seen 6/28/18 and is using zyrtec and allegra and neti pot, as well as ipratropium nasal spray. She could not take the mdp. She is plugged in head - has yellow mucous. She feels miserable - very fatigued/weak.   Hard to get up and do things in

## 2018-07-06 NOTE — PROGRESS NOTES
Sodium and chloride are low, it may be due to current illness  Let's repeat CMP  Other labs are stable

## 2018-07-08 ENCOUNTER — TELEPHONE (OUTPATIENT)
Dept: FAMILY MEDICINE CLINIC | Facility: CLINIC | Age: 68
End: 2018-07-08

## 2018-07-09 ENCOUNTER — OFFICE VISIT (OUTPATIENT)
Dept: FAMILY MEDICINE CLINIC | Facility: CLINIC | Age: 68
End: 2018-07-09

## 2018-07-09 ENCOUNTER — TELEPHONE (OUTPATIENT)
Dept: FAMILY MEDICINE CLINIC | Facility: CLINIC | Age: 68
End: 2018-07-09

## 2018-07-09 ENCOUNTER — HOSPITAL ENCOUNTER (EMERGENCY)
Facility: HOSPITAL | Age: 68
Discharge: HOME | End: 2018-07-09
Attending: EMERGENCY MEDICINE
Payer: MEDICARE

## 2018-07-09 VITALS
WEIGHT: 132 LBS | SYSTOLIC BLOOD PRESSURE: 96 MMHG | BODY MASS INDEX: 23.68 KG/M2 | RESPIRATION RATE: 21 BRPM | OXYGEN SATURATION: 100 % | HEART RATE: 79 BPM | TEMPERATURE: 98 F | DIASTOLIC BLOOD PRESSURE: 72 MMHG | HEIGHT: 62.5 IN

## 2018-07-09 VITALS
WEIGHT: 132 LBS | SYSTOLIC BLOOD PRESSURE: 116 MMHG | RESPIRATION RATE: 18 BRPM | HEART RATE: 76 BPM | DIASTOLIC BLOOD PRESSURE: 52 MMHG | HEIGHT: 62.5 IN | TEMPERATURE: 99 F | BODY MASS INDEX: 23.68 KG/M2

## 2018-07-09 DIAGNOSIS — R50.9 FEVER, UNSPECIFIED FEVER CAUSE: Primary | ICD-10-CM

## 2018-07-09 DIAGNOSIS — J32.9 SINUSITIS, UNSPECIFIED CHRONICITY, UNSPECIFIED LOCATION: ICD-10-CM

## 2018-07-09 DIAGNOSIS — R11.2 NAUSEA AND VOMITING, INTRACTABILITY OF VOMITING NOT SPECIFIED, UNSPECIFIED VOMITING TYPE: ICD-10-CM

## 2018-07-09 DIAGNOSIS — R11.2 NAUSEA AND VOMITING IN ADULT: Primary | ICD-10-CM

## 2018-07-09 LAB
ALBUMIN SERPL-MCNC: 3.6 G/DL (ref 3.5–4.8)
ALP LIVER SERPL-CCNC: 117 U/L (ref 55–142)
ALT SERPL-CCNC: 18 U/L (ref 14–54)
AST SERPL-CCNC: 19 U/L (ref 15–41)
BASOPHILS # BLD AUTO: 0.01 X10(3) UL (ref 0–0.1)
BASOPHILS NFR BLD AUTO: 0.3 %
BILIRUB SERPL-MCNC: 0.7 MG/DL (ref 0.1–2)
BILIRUB UR QL STRIP.AUTO: NEGATIVE
BUN BLD-MCNC: 12 MG/DL (ref 8–20)
CALCIUM BLD-MCNC: 8.7 MG/DL (ref 8.3–10.3)
CHLORIDE: 97 MMOL/L (ref 101–111)
CLARITY UR REFRACT.AUTO: CLEAR
CO2: 25 MMOL/L (ref 22–32)
COLOR UR AUTO: YELLOW
CREAT BLD-MCNC: 0.99 MG/DL (ref 0.55–1.02)
EOSINOPHIL # BLD AUTO: 0.2 X10(3) UL (ref 0–0.3)
EOSINOPHIL NFR BLD AUTO: 5.6 %
ERYTHROCYTE [DISTWIDTH] IN BLOOD BY AUTOMATED COUNT: 12.8 % (ref 11.5–16)
GLUCOSE BLD-MCNC: 102 MG/DL (ref 70–99)
GLUCOSE UR STRIP.AUTO-MCNC: NEGATIVE MG/DL
HCT VFR BLD AUTO: 32.9 % (ref 34–50)
HGB BLD-MCNC: 11.4 G/DL (ref 12–16)
IMMATURE GRANULOCYTE COUNT: 0.01 X10(3) UL (ref 0–1)
IMMATURE GRANULOCYTE RATIO %: 0.3 %
KETONES UR STRIP.AUTO-MCNC: 20 MG/DL
LEUKOCYTE ESTERASE UR QL STRIP.AUTO: NEGATIVE
LYMPHOCYTES # BLD AUTO: 0.2 X10(3) UL (ref 0.9–4)
LYMPHOCYTES NFR BLD AUTO: 5.6 %
M PROTEIN MFR SERPL ELPH: 7.5 G/DL (ref 6.1–8.3)
MCH RBC QN AUTO: 30.4 PG (ref 27–33.2)
MCHC RBC AUTO-ENTMCNC: 34.7 G/DL (ref 31–37)
MCV RBC AUTO: 87.7 FL (ref 81–100)
MONOCYTES # BLD AUTO: 0.53 X10(3) UL (ref 0.1–1)
MONOCYTES NFR BLD AUTO: 14.8 %
NEUTROPHIL ABS PRELIM: 2.62 X10 (3) UL (ref 1.3–6.7)
NEUTROPHILS # BLD AUTO: 2.62 X10(3) UL (ref 1.3–6.7)
NEUTROPHILS NFR BLD AUTO: 73.4 %
NITRITE UR QL STRIP.AUTO: NEGATIVE
PH UR STRIP.AUTO: 6 [PH] (ref 4.5–8)
PLATELET # BLD AUTO: 183 10(3)UL (ref 150–450)
POTASSIUM SERPL-SCNC: 4.4 MMOL/L (ref 3.6–5.1)
PROT UR STRIP.AUTO-MCNC: NEGATIVE MG/DL
RBC # BLD AUTO: 3.75 X10(6)UL (ref 3.8–5.1)
RBC UR QL AUTO: NEGATIVE
RED CELL DISTRIBUTION WIDTH-SD: 41.7 FL (ref 35.1–46.3)
SODIUM SERPL-SCNC: 130 MMOL/L (ref 136–144)
SP GR UR STRIP.AUTO: 1.01 (ref 1–1.03)
UROBILINOGEN UR STRIP.AUTO-MCNC: <2 MG/DL
WBC # BLD AUTO: 3.6 X10(3) UL (ref 4–13)

## 2018-07-09 PROCEDURE — 85025 COMPLETE CBC W/AUTO DIFF WBC: CPT | Performed by: EMERGENCY MEDICINE

## 2018-07-09 PROCEDURE — 99284 EMERGENCY DEPT VISIT MOD MDM: CPT

## 2018-07-09 PROCEDURE — 96376 TX/PRO/DX INJ SAME DRUG ADON: CPT

## 2018-07-09 PROCEDURE — 99214 OFFICE O/P EST MOD 30 MIN: CPT | Performed by: FAMILY MEDICINE

## 2018-07-09 PROCEDURE — 96375 TX/PRO/DX INJ NEW DRUG ADDON: CPT

## 2018-07-09 PROCEDURE — 81003 URINALYSIS AUTO W/O SCOPE: CPT | Performed by: EMERGENCY MEDICINE

## 2018-07-09 PROCEDURE — 96361 HYDRATE IV INFUSION ADD-ON: CPT

## 2018-07-09 PROCEDURE — 80053 COMPREHEN METABOLIC PANEL: CPT | Performed by: EMERGENCY MEDICINE

## 2018-07-09 PROCEDURE — 96374 THER/PROPH/DIAG INJ IV PUSH: CPT

## 2018-07-09 RX ORDER — ONDANSETRON 4 MG/1
8 TABLET, ORALLY DISINTEGRATING ORAL EVERY 8 HOURS PRN
Qty: 10 TABLET | Refills: 0 | Status: SHIPPED | OUTPATIENT
Start: 2018-07-09 | End: 2018-07-13 | Stop reason: ALTCHOICE

## 2018-07-09 RX ORDER — ONDANSETRON 2 MG/ML
4 INJECTION INTRAMUSCULAR; INTRAVENOUS ONCE
Status: COMPLETED | OUTPATIENT
Start: 2018-07-09 | End: 2018-07-09

## 2018-07-09 RX ORDER — KETOROLAC TROMETHAMINE 30 MG/ML
30 INJECTION, SOLUTION INTRAMUSCULAR; INTRAVENOUS ONCE
Status: COMPLETED | OUTPATIENT
Start: 2018-07-09 | End: 2018-07-09

## 2018-07-09 NOTE — ED INITIAL ASSESSMENT (HPI)
Arrives via [facility]/ Dick Bo- [facility] from [place]. Returned to the doctor's office because was feeling worse after was being treated for sinusitis. Had a fever of over 101 in the office. EMS administered Zofran for vomiting at around 1000 orally.   Was [employer]

## 2018-07-09 NOTE — TELEPHONE ENCOUNTER
Dony Downing is ask that MD calls him back in regards to the pt. Dony Downing did not want to leave a detailed message.

## 2018-07-09 NOTE — TELEPHONE ENCOUNTER
Pt currently being txed for sinus infection. Sxs have been going on x 5 wks now. Currently on her 3rd day of generic Bactrim and ipratropium.  Tells me she finished a course of what she thinks was a type of PCN and took 1-2 d of another, which she couldn't

## 2018-07-09 NOTE — TELEPHONE ENCOUNTER
I spoke with Dr. Blue Bryan at the emergency room. Recommend doing CT scan of the sinuses and brain. Start Levaquin 250 mg daily for the next 7 days.   Follow-up in 2-3 weeks or as needed

## 2018-07-09 NOTE — TELEPHONE ENCOUNTER
I spoke with family member as Fabian Peterson is in bed. She now has a fever of 100 and sore throat.   They will bring her in to see Dr. Monica Nicolas today    Future Appointments  Date Time Provider Maude Balderas   7/9/2018 9:15 AM Tray Anthony MD EMG 20 EMG 12

## 2018-07-09 NOTE — PATIENT INSTRUCTIONS
Thank you for choosing Jerry Dubois MD at [place] [address]  To Do: Verta Plevna  1. Patient to go to ED  THE [facility] Reference Lab is located in [address]. Monday, Tuesday & Friday – 8 a.m. to 4 p.m. Wednesday, Thursday – 7 a.m. to 3 p.m.   The lab potential risks and we strive to make you healthier and to improve your quality of life.     Referrals, and Radiology Information:    If your insurance requires a referral to a specialist, please allow 5 business days to process your referral request.    If

## 2018-07-09 NOTE — PROGRESS NOTES
HPI:    Patient ID: Josué Sebastian is a 75year old female. HPI  Ms. Astrid Ritter is a 68-year-old female with history of myelodysplastic syndrome who we have seen here for sinus infection.   She was initially treated with Augmentin but had nausea and vom mouth 3 (three) times daily. Disp:  Rfl:    Ranitidine HCl (ZANTAC OR) Take 1 tablet by mouth daily. Disp:  Rfl:    B Complex-C (SUPER B COMPLEX OR) Take  by mouth.  Disp:  Rfl:      Allergies:  Augmentin [Amoxicil*    NAUSEA AND VOMITING  Compazine room   EMS was called and they came and promptly    No orders of the defined types were placed in this encounter.       Meds This Visit:  No prescriptions requested or ordered in this encounter    Imaging & Referrals:  None       CHILANGO#[id number]

## 2018-07-09 NOTE — ED PROVIDER NOTES
Patient Seen in: [facility] Emergency Department    History   Patient presents with:  Nausea/Vomiting/Diarrhea (gastrointestinal)    Stated Complaint: nausea/vomiting    HPI    54-year-old female with history of myelodysplastic disorder presents for Alcohol use: No                Review of Systems    Positive for stated complaint: nausea/vomiting  Other systems are as noted in HPI. Constitutional and vital signs reviewed.       All other systems reviewed and negative except as noted a -----------         ------                     CBC W/ DIFFERENTIAL[[id number]]          Abnormal            Final result                 Please view results for these tests on the individual orders.    RAINBOW DRAW BLUE   RAINBOW DRAW LAVENDER   IVY COOPER

## 2018-07-09 NOTE — TELEPHONE ENCOUNTER
Patient is in the ER and they are asking to speak with Dr. Suhas Mendoza who sent patient there from visit in our office today.

## 2018-07-10 ENCOUNTER — HOSPITAL ENCOUNTER (OUTPATIENT)
Dept: CT IMAGING | Age: 68
Discharge: HOME | End: 2018-07-10
Attending: FAMILY MEDICINE
Payer: MEDICARE

## 2018-07-10 ENCOUNTER — TELEPHONE (OUTPATIENT)
Dept: FAMILY MEDICINE CLINIC | Facility: CLINIC | Age: 68
End: 2018-07-10

## 2018-07-10 DIAGNOSIS — R51.9 HEADACHE AROUND THE EYES: ICD-10-CM

## 2018-07-10 DIAGNOSIS — J01.80 OTHER ACUTE SINUSITIS, RECURRENCE NOT SPECIFIED: ICD-10-CM

## 2018-07-10 DIAGNOSIS — Z98.890 H/O SINUS SURGERY: ICD-10-CM

## 2018-07-10 DIAGNOSIS — J01.80 OTHER ACUTE SINUSITIS, RECURRENCE NOT SPECIFIED: Primary | ICD-10-CM

## 2018-07-10 PROCEDURE — 70486 CT MAXILLOFACIAL W/O DYE: CPT | Performed by: FAMILY MEDICINE

## 2018-07-10 PROCEDURE — 70450 CT HEAD/BRAIN W/O DYE: CPT | Performed by: FAMILY MEDICINE

## 2018-07-10 RX ORDER — LEVOFLOXACIN 500 MG/1
500 TABLET, FILM COATED ORAL DAILY
Qty: 10 TABLET | Refills: 0 | Status: SHIPPED | OUTPATIENT
Start: 2018-07-10 | End: 2018-07-20

## 2018-07-10 NOTE — TELEPHONE ENCOUNTER
Patient informed of all. She is taking daily zantac and will continue. She will scheduled CT scans - orders placed for plain testing.

## 2018-07-10 NOTE — TELEPHONE ENCOUNTER
Patient was seen in ER yesterday-taken by ambulance from office. Patient still has nausea, and her fever is off/on. She also states that her lab results were not good. She states that she was told to contact office to find out what to do next.

## 2018-07-10 NOTE — TELEPHONE ENCOUNTER
Pt called [address] office to let us know she cannot get in for a CT Scan until late Friday afternoon. She is asking if there is anything our office can do to get her in earlier than that. Please call and advise pt.

## 2018-07-10 NOTE — TELEPHONE ENCOUNTER
PT would like to know if our office can get her in sooner for an appt for CT testing. IF pt needs to have testing sooner than order has to be changed to STAT vs normal testing.      Please advise if an earlier appt is needed  Future Appointments  Date Time

## 2018-07-10 NOTE — TELEPHONE ENCOUNTER
Patient is using ondansetron 4mg - 2 every 8 hours for nausea and vomiting. She has pain behind her eyes - no imaging done at ER yesterday.   Dr. Real Ramachandran will see patient at [phone] today if she would like to come in - patient states she is weak and does not

## 2018-07-11 ENCOUNTER — PATIENT MESSAGE (OUTPATIENT)
Dept: FAMILY MEDICINE CLINIC | Facility: CLINIC | Age: 68
End: 2018-07-11

## 2018-07-11 NOTE — TELEPHONE ENCOUNTER
From: Dylan Villar  To: Kevin Duff  Sent: 7/11/2018 12:24 PM CDT  Subject: Other    I have not started the new medicine, Levaquin, but I have a rash that is spreading all over my body.  Will this go away by itself, or do I need to take s
PT states that she completed Bactrim 2 days ago for sinus infection and fluid behind her ear, pt states she stopped the medication Sunday due to allergic reaction and she came into the office Monday and was sent over to the ER.  PT was given Levaquin inpati
Spoke to patient informed of MD recommendations, patient verbalized understanding, she will buy OTC benadryl and try that
Try taking Benadryl for now and see if rash will dissipate. Hold Levaquin for now.
No

## 2018-07-11 NOTE — PROGRESS NOTES
Attempted to reach pt, no answer, line is busy, sent mychart to pt to contact the office to further assess her symptoms    Future Appointments  Date Time Provider Maude Balderas   7/19/2018 8:40 AM Ronald Viveros MD  [facility]

## 2018-07-13 PROBLEM — J30.1 NON-SEASONAL ALLERGIC RHINITIS DUE TO POLLEN: Status: ACTIVE | Noted: 2018-07-13

## 2018-07-16 ENCOUNTER — PATIENT MESSAGE (OUTPATIENT)
Dept: FAMILY MEDICINE CLINIC | Facility: CLINIC | Age: 68
End: 2018-07-16

## 2018-07-16 NOTE — TELEPHONE ENCOUNTER
From: Nava Gutierrez  To: Lorna Holt MD  Sent: 7/16/2018 7:45 AM CDT  Subject: Test Results Question    I have several items:  1. I spent the weekend nauseated and vomiting on Sunday. 2. ENT doctor says infection is gone and sinuses are clear.  A

## 2018-07-16 NOTE — PROGRESS NOTES
Advised pt to contact us regarding her N/V to schedule an appt.      Please advise on patients CBC labs-7/9/18

## 2018-08-02 ENCOUNTER — OFFICE VISIT (OUTPATIENT)
Dept: PHYSICAL THERAPY | Age: 68
End: 2018-08-02
Attending: PHYSICIAN ASSISTANT
Payer: MEDICARE

## 2018-08-02 DIAGNOSIS — M79.605 LEFT LEG PAIN: ICD-10-CM

## 2018-08-02 DIAGNOSIS — R53.83 FATIGUE, UNSPECIFIED TYPE: ICD-10-CM

## 2018-08-02 PROCEDURE — 97140 MANUAL THERAPY 1/> REGIONS: CPT

## 2018-08-02 PROCEDURE — 97161 PT EVAL LOW COMPLEX 20 MIN: CPT

## 2018-08-02 PROCEDURE — 97110 THERAPEUTIC EXERCISES: CPT

## 2018-08-02 NOTE — PROGRESS NOTES
LOWER EXTREMITY EVALUATION:   Referring Physician: Dr. Alphonso Falcon  Diagnosis: Fatigue, unspecified type, Left leg pain     Date of Service: 8/2/2018     PATIENT SUMMARY   Dylan Villar is a 75year old female who presents to therapy today with complain inside, feels like dull aching, no radiating below the knee, soreness in the back from overwork, no numbenss/tingling, takes tylenol intermittently, no waking at night.    Current functional limitations include pain with squatting, turning in bed, performin to an old injury in which mm was chronically injured and opted not to repair surgically. Accessory motion lumbar spine hypomobile throughout, but pain in hip and knee not brought on by mobilization and assessment, nor with repeated motions extension.  In ag pain/C-sign with assessment   Quads: R WNL; L restricted to about 90 deg due to reported inc tension at superior quad near old injury  Gastroc-soleus: R very MIN inc mm tension; L MOD inc mm tension    Strength/MMT:   Hip Knee   Flexion: R 4/5; L 4/5  Exte PT.    Frequency / Duration: Patient will be seen for 2 x/week or a total of 10 visits over a 90 day period. Treatment will include: Manual Therapy; Therapeutic Exercises; Neuromuscular Re-education;  Therapeutic Activity; Gait Training; Electrical Stim; Pt

## 2018-08-07 ENCOUNTER — OFFICE VISIT (OUTPATIENT)
Dept: PHYSICAL THERAPY | Age: 68
End: 2018-08-07
Attending: PHYSICIAN ASSISTANT
Payer: MEDICARE

## 2018-08-07 PROCEDURE — 97110 THERAPEUTIC EXERCISES: CPT

## 2018-08-07 PROCEDURE — 97140 MANUAL THERAPY 1/> REGIONS: CPT

## 2018-08-07 NOTE — PROGRESS NOTES
Dx: Fatigue, unspecified type, Left leg pain            Authorized # of Visits:  10 requested [facility])         Next MD visit: none scheduled  Fall Risk: standard         Precautions: hx of CA in remission             Subjective: Patient reports that she was n Manual x 15 min  - L knee, quad STM  - L PA knee mobs   - L knee distraction   - ML pat mobs          -         Skilled Services: Reviewed HEP with VC and modelling to ensure proper performance and carryover at home.  Therex progression within tolerance,

## 2018-08-08 PROBLEM — I70.0 AORTIC ATHEROSCLEROSIS (HCC): Status: ACTIVE | Noted: 2018-08-08

## 2018-08-09 ENCOUNTER — OFFICE VISIT (OUTPATIENT)
Dept: PHYSICAL THERAPY | Age: 68
End: 2018-08-09
Attending: PHYSICIAN ASSISTANT
Payer: MEDICARE

## 2018-08-09 PROCEDURE — 97140 MANUAL THERAPY 1/> REGIONS: CPT

## 2018-08-09 PROCEDURE — 97110 THERAPEUTIC EXERCISES: CPT

## 2018-08-09 NOTE — PROGRESS NOTES
Dx: Fatigue, unspecified type, Left leg pain            Authorized # of Visits:  10 requested [facility])         Next MD visit: none scheduled  Fall Risk: standard         Precautions: hx of CA in remission             Subjective: Patient reports that the knee x 10 R/L Side step RTB x 2 laps // bars        SAQ, 3# 2 x 10 L SAQ, 3# 2 x 10 L        Shuttle  - DLS, 4B 3 x 10  - SLS, 3B x 10 - TRX squats x 10   - 6\" step ups 2 x 10        4\" fwd tap down min UE assist 3 x 5 (hard) 4\" fwd tap down min UE assist 3

## 2018-08-13 ENCOUNTER — MYAURORA ACCOUNT LINK (OUTPATIENT)
Dept: OTHER | Age: 68
End: 2018-08-13

## 2018-08-13 ENCOUNTER — PRIOR ORIGINAL RECORDS (OUTPATIENT)
Dept: OTHER | Age: 68
End: 2018-08-13

## 2018-08-14 ENCOUNTER — OFFICE VISIT (OUTPATIENT)
Dept: PHYSICAL THERAPY | Age: 68
End: 2018-08-14
Attending: PHYSICIAN ASSISTANT
Payer: MEDICARE

## 2018-08-14 PROCEDURE — 97140 MANUAL THERAPY 1/> REGIONS: CPT

## 2018-08-14 PROCEDURE — 97110 THERAPEUTIC EXERCISES: CPT

## 2018-08-14 NOTE — PROGRESS NOTES
Dx: Fatigue, unspecified type, Left leg pain            Authorized # of Visits:  10 requested [facility])         Next MD visit: none scheduled  Fall Risk: standard         Precautions: hx of CA in remission             Subjective: Patient reports she is feeling step RTB x 2 laps // bars       SAQ, 3# 2 x 10 L SAQ, 3# 2 x 10 L LAQ, 3# 2 x 10       Shuttle  - DLS, 4B 3 x 10  - SLS, 3B x 10 - TRX squats x 10   - 6\" step ups 2 x 10 - squats to chair x 10, x 5  - seated crossover stretch x 15       4\" fwd tap down m

## 2018-08-16 ENCOUNTER — APPOINTMENT (OUTPATIENT)
Dept: PHYSICAL THERAPY | Age: 68
End: 2018-08-16
Attending: PHYSICIAN ASSISTANT
Payer: MEDICARE

## 2018-08-16 ENCOUNTER — OFFICE VISIT (OUTPATIENT)
Dept: PHYSICAL THERAPY | Age: 68
End: 2018-08-16
Attending: PHYSICIAN ASSISTANT
Payer: MEDICARE

## 2018-08-16 PROCEDURE — 97140 MANUAL THERAPY 1/> REGIONS: CPT

## 2018-08-16 PROCEDURE — 97110 THERAPEUTIC EXERCISES: CPT

## 2018-08-16 NOTE — PROGRESS NOTES
Dx: Fatigue, unspecified type, Left leg pain            Authorized # of Visits:  10 requested [facility])         Next MD visit: none scheduled  Fall Risk: standard         Precautions: hx of CA in remission             Subjective: Patient reports that she was f Date:               TX#: 6/ Date:               TX#: 7/ Date:               TX#: 8/   NuStep L5 x 5 min NuStep L5 x 5 min NuStep L5 x 5 min NuStep L6 x 5 min      Bridge with RTB abd x 10 Bridge with add ball squeeze x 15 - SB ROM x 10  - SB bridge under k

## 2018-08-21 ENCOUNTER — OFFICE VISIT (OUTPATIENT)
Dept: PHYSICAL THERAPY | Age: 68
End: 2018-08-21
Attending: PHYSICIAN ASSISTANT
Payer: MEDICARE

## 2018-08-21 PROCEDURE — 97110 THERAPEUTIC EXERCISES: CPT

## 2018-08-21 PROCEDURE — 97140 MANUAL THERAPY 1/> REGIONS: CPT

## 2018-08-21 NOTE — PROGRESS NOTES
Dx: Fatigue, unspecified type, Left leg pain            Authorized # of Visits:  10 requested [facility])         Next MD visit: none scheduled  Fall Risk: standard         Precautions: hx of CA in remission             Subjective: Patient reports that she had a Date: 8/21/18    TX#: 6/10 Date:               TX#: 7/ Date:               TX#: 8/   NuStep L5 x 5 min NuStep L5 x 5 min NuStep L5 x 5 min NuStep L6 x 5 min NuStep L4 x 5 min     Bridge with RTB abd x 10 Bridge with add ball squeeze x 15 - SB ROM x 10  - S squatting, progressed HEP as listed below. Manual therapy to improve mobility and decrease pain as needed, fading time spent on this.     HEP: QS with towel roll, hooklying bent knee fallout with RTB, bridge with RTB abd, and standing gastroc stretch at wal

## 2018-08-23 ENCOUNTER — OFFICE VISIT (OUTPATIENT)
Dept: PHYSICAL THERAPY | Age: 68
End: 2018-08-23
Attending: PHYSICIAN ASSISTANT
Payer: MEDICARE

## 2018-08-23 PROCEDURE — 97112 NEUROMUSCULAR REEDUCATION: CPT

## 2018-08-23 PROCEDURE — 97140 MANUAL THERAPY 1/> REGIONS: CPT

## 2018-08-23 PROCEDURE — 97110 THERAPEUTIC EXERCISES: CPT

## 2018-08-23 NOTE — PROGRESS NOTES
Dx: Fatigue, unspecified type, Left leg pain            Authorized # of Visits:  10 requested [facility])         Next MD visit: none scheduled  Fall Risk: standard         Precautions: hx of CA in remission             Subjective: Patient reports that while she Reassess goals as needed.    Date: 8/7/2018 TX#: 2/10 Date: 8/9/18      TX#: 3/10   Date: 8/14/18    TX#: 4/10 Date: 8/16/18   TX#: 5/10 Date: 8/21/18    TX#: 6/10 Date: 8/23/18     TX#: 7/10 Date:               TX#: 8/   NuStep L5 x 5 min NuStep L5 x 5 min lumbar STM  - L knee, lat thigh STM  - HS stretch/glide  - flex, add, IR glides  Manual x 15 min  - L knee, lat thigh STM  - HS stretch/glide  - flex, add, IR glides  - tib-fib glides Manual x 12 min  - L knee, lat thigh STM  - HS stretch/glide  - flex, ad

## 2018-08-28 ENCOUNTER — OFFICE VISIT (OUTPATIENT)
Dept: PHYSICAL THERAPY | Age: 68
End: 2018-08-28
Attending: PHYSICIAN ASSISTANT
Payer: MEDICARE

## 2018-08-28 PROCEDURE — 97110 THERAPEUTIC EXERCISES: CPT

## 2018-08-28 PROCEDURE — 97140 MANUAL THERAPY 1/> REGIONS: CPT

## 2018-08-28 NOTE — PROGRESS NOTES
Dx: Fatigue, unspecified type, Left leg pain            Authorized # of Visits:  10 requested [facility])         Next MD visit: none scheduled  Fall Risk: standard         Precautions: hx of CA in remission             Subjective: Patient reports that she has b 8/21/18    TX#: 6/10 Date: 8/23/18     TX#: 7/10 Date: 8/28/18   TX#: 8/10   NuStep L5 x 5 min NuStep L5 x 5 min NuStep L5 x 5 min NuStep L6 x 5 min NuStep L4 x 5 min NuStep L6 x 5 min NuStep L6 x 5 min   Bridge with RTB abd x 10 Bridge with add ball squee ML pat mobs  - lumbar STM, sacral rocking, C-PA mobs gr II  Manual x 15 min  - lumbar STM  - L knee, lat thigh STM  - HS stretch/glide  - flex, add, IR glides  Manual x 15 min  - L knee, lat thigh STM  - HS stretch/glide  - flex, add, IR glides  - tib-fib

## 2018-09-04 ENCOUNTER — OFFICE VISIT (OUTPATIENT)
Dept: PHYSICAL THERAPY | Age: 68
End: 2018-09-04
Attending: PHYSICIAN ASSISTANT
Payer: MEDICARE

## 2018-09-04 PROCEDURE — 97110 THERAPEUTIC EXERCISES: CPT

## 2018-09-04 PROCEDURE — 97140 MANUAL THERAPY 1/> REGIONS: CPT

## 2018-09-04 NOTE — PROGRESS NOTES
Dx: Fatigue, unspecified type, Left leg pain            Authorized # of Visits:  10 requested [facility])         Next MD visit: none scheduled  Fall Risk: standard         Precautions: hx of CA in remission             Subjective: Patient feeling frustrated bec compliant in HEP to maintain progress made in PT. - issued and progressed    Plan: Patient will be going on a trip out of the country for about a month after next session, write PN and likely to have patient f/u after her return.     Date: 8/9/18      TX#: Squats to raised seat height x 12 Squatting/kneeling throughout as comparative sign   Standing lumbar extension at table x 10 - - Deep squats holding bar 2 x 10 SLS tap to cone x 20, no UEs  - SLS catch x 2 min total, L - Discussed and edu on proper use of

## 2018-09-06 ENCOUNTER — OFFICE VISIT (OUTPATIENT)
Dept: PHYSICAL THERAPY | Age: 68
End: 2018-09-06
Attending: PHYSICIAN ASSISTANT
Payer: MEDICARE

## 2018-09-06 PROCEDURE — 97110 THERAPEUTIC EXERCISES: CPT

## 2018-09-06 PROCEDURE — 97140 MANUAL THERAPY 1/> REGIONS: CPT

## 2018-09-06 NOTE — PROGRESS NOTES
Progress Summary  Dx:  Fatigue, unspecified type, Left leg pain            Authorized # of Visits:  10 requested [facility])         Next MD visit: none scheduled  Fall Risk: standard         Precautions: hx of CA in remission      Pt has attended 10, cancelled when she returns from her trip.      Objective:      AROM:   Hip Knee   Flexion: R 110; L 110  Abduction: R 25; L 25  ER: R 40; L 38  IR: R 25; L 20 Flexion: R 135; L 135  Extension: R 0; L 0         Accessory motion: Patellar mobility initially restricted planning and for this course of care. Thank you for your referral. Please co-sign or sign and return this letter via fax as soon as possible to [phone]. If you have any questions, please contact me at Dept: [phone].     Sincerely,  Electronical lunge to airex x 15 R/L Standing ITB stretch at wall 3 x 30s Standing ITB stretch at wall 3 x 30s Standing ITB stretch at wall 3 x 30s   4\" fwd tap down min UE assist 2 x 10 (improving) 4\" fwd tap down min UE assist 2 x 10 (improving) 4\" fwd tap down mi stretch    Charges: Manual x 1, Therex x 2       Total Timed Treatment: 45 min  Total Treatment Time: 45 min

## 2018-09-11 ENCOUNTER — OFFICE VISIT (OUTPATIENT)
Dept: PHYSICAL THERAPY | Age: 68
End: 2018-09-11
Attending: PHYSICIAN ASSISTANT
Payer: MEDICARE

## 2018-09-11 ENCOUNTER — APPOINTMENT (OUTPATIENT)
Dept: PHYSICAL THERAPY | Age: 68
End: 2018-09-11
Attending: PHYSICIAN ASSISTANT
Payer: MEDICARE

## 2018-09-11 PROCEDURE — 97110 THERAPEUTIC EXERCISES: CPT

## 2018-09-11 PROCEDURE — 97140 MANUAL THERAPY 1/> REGIONS: CPT

## 2018-09-11 NOTE — PROGRESS NOTES
Dx: Fatigue, unspecified type, Left leg pain            Authorized # of Visits:  18 requested [facility])         Next MD visit: none scheduled  Fall Risk: standard         Precautions: hx of CA in remission        Subjective: Patient initially was not going to excessive anterior glide of the tibia with squatting in this manner. Dec pain following seated OA glides and NMR HS activation in squat. Will see patient when she returns from her trip. Goals: (To be completed in 18 visits)  1.  Patient will demonstrate ups, light UEs as needed with glute set x 20 L  - alt fwd lunge to airex x 10 R/L - bosu alt fwd lunge to airex x 15 R/L Standing ITB stretch at wall 3 x 30s Standing ITB stretch at wall 3 x 30s Standing ITB stretch at wall 3 x 30s -   4\" fwd tap down min tolerance including HS activation to promote proper tibial alignment and dec possible increased anterior glide with squat in stance (typically painful for patient).      HEP: QS with towel roll, hooklying bent knee fallout with RTB, bridge with RTB abd, and

## 2018-10-04 ENCOUNTER — OFFICE VISIT (OUTPATIENT)
Dept: PHYSICAL THERAPY | Age: 68
End: 2018-10-04
Attending: PHYSICIAN ASSISTANT
Payer: MEDICARE

## 2018-10-04 PROCEDURE — 97110 THERAPEUTIC EXERCISES: CPT

## 2018-10-04 PROCEDURE — 97140 MANUAL THERAPY 1/> REGIONS: CPT

## 2018-10-04 NOTE — PROGRESS NOTES
Dx: Fatigue, unspecified type, Left leg pain            Authorized # of Visits:  18 requested [facility])         Next MD visit: none scheduled  Fall Risk: standard         Precautions: hx of CA in remission        Subjective: Patient has not been seen for about Patient will demonstrate ability to perform rolling in bed without pain. - MET  3. Patient will improve BLE strength to 5/5 to improve ability to perform IADLs. - progress  4.  Patient will be independent and compliant in HEP to maintain progress made in PT 10, x 5 (feeling easier) Hip hikes with manual knee block, TC x 12 R/L (hard) Squats to raised seat height x 12 Squatting/kneeling throughout as comparative sign Squatting/kneeling throughout as comparative sign - - SAQ with tibial ER x 25  - LAQ, 3# x 10 Treatment: 45  min  Total Treatment Time: 55 min

## 2018-10-11 ENCOUNTER — APPOINTMENT (OUTPATIENT)
Dept: PHYSICAL THERAPY | Age: 68
End: 2018-10-11
Attending: PHYSICIAN ASSISTANT
Payer: MEDICARE

## 2018-10-11 ENCOUNTER — OFFICE VISIT (OUTPATIENT)
Dept: PHYSICAL THERAPY | Age: 68
End: 2018-10-11
Attending: PHYSICIAN ASSISTANT
Payer: MEDICARE

## 2018-10-11 PROCEDURE — 97140 MANUAL THERAPY 1/> REGIONS: CPT

## 2018-10-11 PROCEDURE — 97110 THERAPEUTIC EXERCISES: CPT

## 2018-10-11 NOTE — PROGRESS NOTES
Dx: Fatigue, unspecified type, Left leg pain            Authorized # of Visits:  18 requested [facility])         Next MD visit: none scheduled  Fall Risk: standard         Precautions: hx of CA in remission        Subjective: Patient reports that this week has 10/11/18   TX#: 13/18   NuStep L4 x 5 min NuStep L6 x 5 min NuStep L6 x 5 min Recumbent bike L2 x 5 min Recumbent bike L2 x 5 min NuStep L6 x 5 min Recumbent bike L3 x 5 min Recumbent bike L3 x 5 min   - SB bridge under knees 2 x 5  - SLR L, 1# x 10, x 5 - lunge x 10   Deep squats holding bar 2 x 10 SLS tap to cone x 20, no UEs  - SLS catch x 2 min total, L - Discussed and edu on proper use of knee brace, HEP, dagnosis Flight of stairs and 6\" step ups with cued glute recruitment  - Sit<>stand used as differ x 1, Therex x 2      Total Timed Treatment: 45  min  Total Treatment Time: 50 min

## 2018-10-18 ENCOUNTER — OFFICE VISIT (OUTPATIENT)
Dept: PHYSICAL THERAPY | Age: 68
End: 2018-10-18
Attending: PHYSICIAN ASSISTANT
Payer: MEDICARE

## 2018-10-18 PROCEDURE — 97110 THERAPEUTIC EXERCISES: CPT

## 2018-10-18 PROCEDURE — 97140 MANUAL THERAPY 1/> REGIONS: CPT

## 2018-10-18 NOTE — PROGRESS NOTES
Dx: Fatigue, unspecified type, Left leg pain            Authorized # of Visits:  18 requested [facility])         Next MD visit: none scheduled  Fall Risk: standard         Precautions: hx of CA in remission        Subjective: Patient reports that she was feelin maintain progress made in PT. - issued and progressed      Plan: Continue PT with fibular head posterior glide, HS/glute strengthening/recruitment. Further meniscal assessment.    Date: 8/23/18     TX#: 7/10 Date: 8/28/18   TX#: 8/10 Date: 9/4/18   TX#: 9/1 stepping in // bars x 5 laps   Hip hikes with manual knee block, TC x 12 R/L (hard) Squats to raised seat height x 12 Squatting/kneeling throughout as comparative sign Squatting/kneeling throughout as comparative sign - - SAQ with tibial ER x 25  - LAQ, 3# posterior translation   Skilled Services: Manual therapy and kinesiotaping to promote posterior glide of fib head with movements requiring knee flex/ext and too much anterior glide of tib-fib with great response.  Continued and progressed therex to assist t

## 2018-10-23 ENCOUNTER — OFFICE VISIT (OUTPATIENT)
Dept: PHYSICAL THERAPY | Age: 68
End: 2018-10-23
Attending: PHYSICIAN ASSISTANT
Payer: MEDICARE

## 2018-10-23 PROCEDURE — 97140 MANUAL THERAPY 1/> REGIONS: CPT

## 2018-10-23 PROCEDURE — 97110 THERAPEUTIC EXERCISES: CPT

## 2018-10-23 NOTE — PROGRESS NOTES
Dx: Fatigue, unspecified type, Left leg pain            Authorized # of Visits:  18 requested [facility])         Next MD visit: none scheduled  Fall Risk: standard         Precautions: hx of CA in remission        Subjective: Patient reports that she has not be independent and compliant in HEP to maintain progress made in PT. - issued and progressed      Plan: Continue with HEP, follow-up in 2 weeks, patient to schedule with ortho for further care.    Date: 8/28/18   TX#: 8/10 Date: 9/4/18   TX#: 9/10 Date: 9/6/18 standing hip ext x 15 R/L  - retro stepping in // bars x 5 laps RTB  - standing hip ext x 15 R/L  - retro stepping in // bars x 5 laps -   Squats to raised seat height x 12 Squatting/kneeling throughout as comparative sign Squatting/kneeling throughout as min kinesiotaping L knee  Leukotaping for posterior translation fibular head  Kinesiotaping medial posterior for posterior translation -   Skilled Services:  Increased time spent in WB, working through typically painful AROM with manual assist to diminish p

## 2018-11-06 ENCOUNTER — OFFICE VISIT (OUTPATIENT)
Dept: PHYSICAL THERAPY | Age: 68
End: 2018-11-06
Attending: PHYSICIAN ASSISTANT
Payer: MEDICARE

## 2018-11-06 PROCEDURE — 97110 THERAPEUTIC EXERCISES: CPT

## 2018-11-06 NOTE — PROGRESS NOTES
Discharge Summary  Dx:  Fatigue, unspecified type, Left leg pain            Authorized # of Visits:  [id number] requested [facility])         Next MD visit: none scheduled  Fall Risk: standard         Precautions: hx of CA in remission      Pt has attended 16, cancelled strength. Feel that she will do well with her HEP and will continue to improve. Patient to D/C from PT at this time.      Objective:   AROM:   Hip Knee   Flexion: R 110; L 110  Abduction: R 25; L 25  ER: R 40; L 38  IR: R 25; L 20 Flexion: R 135; L 135  Ext this time by anterior knee pain with squatting. She performs this squat in a split stance position, typically with L forward and knee towards ground; this results in increase in anterior translation/force with pain.  If tibia is better controlled (manually) manual HS stretch x 3 min  - prone quad stretch 3 x 30s Seated RTB HS curl to fatigue Bosu alt fwd lunge with light UEs as needed  - 4\" fwd tap down, non-painful only x 20 Reviewed, dicussed, and educated HEP and HEP progression x 15 min   Standing ITB st flexion, ER with extension  - PA glides  - posterior glide with sit>stand Manual x 10 min  - STM HS  - tibial IR with flexion, ER with extension  - PA glides  - posterior glide with sit>stand   Manual x 10 min  - knee reassessment throughout  - tibial IR w

## 2019-02-28 VITALS
WEIGHT: 135 LBS | HEART RATE: 80 BPM | HEIGHT: 63 IN | BODY MASS INDEX: 23.92 KG/M2 | SYSTOLIC BLOOD PRESSURE: 112 MMHG | DIASTOLIC BLOOD PRESSURE: 60 MMHG

## 2019-02-28 VITALS
DIASTOLIC BLOOD PRESSURE: 58 MMHG | HEART RATE: 64 BPM | WEIGHT: 130 LBS | BODY MASS INDEX: 23.04 KG/M2 | SYSTOLIC BLOOD PRESSURE: 118 MMHG | HEIGHT: 63 IN

## 2019-05-23 ENCOUNTER — TELEPHONE (OUTPATIENT)
Dept: FAMILY MEDICINE CLINIC | Facility: CLINIC | Age: 69
End: 2019-05-23

## 2019-12-10 NOTE — TELEPHONE ENCOUNTER
Let us do without contrast Purse String (Intermediate) Text: Given the location of the defect and the characteristics of the surrounding skin a purse string intermediate closure was deemed most appropriate.  Undermining was performed circumfirentially around the surgical defect.  A purse string suture was then placed and tightened.
